# Patient Record
(demographics unavailable — no encounter records)

---

## 2019-06-14 NOTE — RAD REPORT
EXAM DESCRIPTION:  RAD - Chest Single View - 6/14/2019 1:19 pm

 

CLINICAL HISTORY:  Cough and congestion, weakness

 

COMPARISON:  None.

 

TECHNIQUE:  AP portable chest image was obtained 1316 hours .

 

FINDINGS:  Lungs are clear. Heart and vasculature are normal. No measurable pleural effusion and no p
neumothorax. No acute bony abnormality seen. No acute aortic findings suspected.

 

IMPRESSION:  No acute cardiopulmonary process.

## 2019-06-14 NOTE — ER
Nurse's Notes                                                                                     

 Former[facility]                                                                 

Name: Philip Campos                                                                               

Age: 46 yrs                                                                                       

Sex: Male                                                                                         

: 1972                                                                                   

MRN: [id number]                                                                                   

Arrival Date: 2019                                                                          

Time: 12:15                                                                                       

Account#: [id number]                                                                            

Bed 2                                                                                             

Private MD:                                                                                       

Diagnosis: Hypertension: poorly controlled; left lower mandibular pain (no abscess)               

                                                                                                  

Presentation:                                                                                     

                                                                                             

12:18 Presenting complaint: Patient states: "I had an MRI of my back about 2 weeks ago and    aa5 

      the doctor in [place] said my back is messed up but I am just so weak and my left jaw       

      hurts". Pt reports generalized weakness, pt also reports toothache to left side, and        

      body aches. Pt reports symptoms began 1-2 weeks ago. Pt states "I also feel like a ball     

      under my testicles and it hurts a little bit". Transition of care: patient was not          

      received from another setting of care. Onset of symptoms was 2019. Risk                

      Assessment: Do you want to hurt yourself or someone else? Patient reports no desire to      

      harm self or others. Initial Sepsis Screen: Does the patient meet any 2 criteria? No.       

      Patient's initial sepsis screen is negative. Does the patient have a suspected source       

      of infection? No. Patient's initial sepsis screen is negative. Care prior to arrival:       

      None.                                                                                       

12:18 Method Of Arrival: Ambulatory                                                           aa5 

12:18 Acuity: DALIA 3                                                                           aa5 

                                                                                                  

Historical:                                                                                       

- Allergies:                                                                                      

12:21 No Known Allergies;                                                                     aa5 

- PMHx:                                                                                           

12:21 Hypertension; Back problems/pain;                                                       aa5 

- PSHx:                                                                                           

12:21 None;                                                                                   aa5 

                                                                                                  

- Immunization history:: Adult Immunizations unknown.                                             

- Social history:: Smoking status: Patient uses tobacco products, 2 cigarettes a day .            

- Ebola Screening: : No symptoms or risks identified at this time.                                

                                                                                                  

                                                                                                  

Screenin:52 Abuse screen: Denies threats or abuse. Denies injuries from another. Nutritional        ph  

      screening: No deficits noted. Tuberculosis screening: No symptoms or risk factors           

      identified. Fall Risk None identified.                                                      

                                                                                                  

Assessment:                                                                                       

12:45 General: Appears in no apparent distress. uncomfortable, Behavior is calm, cooperative, ph  

      appropriate for age. Pain: Complains of pain in left jaw and back. Neuro: Level of          

      Consciousness is awake, alert, obeys commands, Oriented to person, place, time,             

      situation. Neuro: Reports weakness in " all over" x 2 weeks. Cardiovascular: Capillary      

      refill < 3 seconds in bilateral fingers Patient's skin is warm and dry. Respiratory:        

      Airway is patent Respiratory effort is even, unlabored, Respiratory pattern is regular,     

      symmetrical, Denies cough, shortness of breath. GI: No signs and/or symptoms were           

      reported involving the gastrointestinal system. Patient currently denies abdominal          

      pain, diarrhea, nausea, vomiting. : Reports " a bump " under scrotum that is tender       

      upon palpation. Derm: Skin is intact, is healthy with good turgor, Skin is pink, warm \T\   

      dry. Musculoskeletal: Circulation, motion, and sensation intact. Range of motion:           

      intact in all extremities.                                                                  

12:53 Reassessment: Patient appears in no apparent distress at this time. Patient and/or      ph  

      family updated on plan of care and expected duration. Pain level reassessed. Patient is     

      alert, oriented x 3, equal unlabored respirations, skin warm/dry/pink. Pt ambulated to      

      restroom w/ steady gait, urine sample obtained.                                             

14:00 Reassessment: Patient appears in no apparent distress at this time. Patient and/or      ph  

      family updated on plan of care and expected duration. Pain level reassessed. Patient is     

      alert, oriented x 3, equal unlabored respirations, skin warm/dry/pink. Pt resting           

      quietly, awaiting lab and radiology results, SO at bedside.                                 

14:59 Reassessment: Patient appears in no apparent distress at this time. Patient and/or      ph  

      family updated on plan of care and expected duration. Pain level reassessed. Patient is     

      alert, oriented x 3, equal unlabored respirations, skin warm/dry/pink. Pt medicated for     

      per per ERP order, ambulated to restroom w/ steady gait.                                    

                                                                                                  

Vital Signs:                                                                                      

12:22  / 98; Pulse 100; Resp 16 S; Temp 98.2(TE); Pulse Ox 96% ; Weight 117.93 kg (R);  aa5 

      Height 5 ft. 5 in. (165.10 cm) (R); Pain 10/10;                                             

13:30  / 111; Pulse 92; Resp 21; Temp 98.1(O); Pulse Ox 96% on R/A; Pain 10/10;         dh3 

14:29  / 126; Pulse 87; Resp 22; Temp 97.9(O); Pulse Ox 95% on R/A; Pain 10/10;         dh3 

16:00  / 103; Pulse 85; Resp 18; Pulse Ox 96% on R/A;                                   ph  

12:22 Body Mass Index 43.27 (117.93 kg, 165.10 cm)                                            aa5 

                                                                                                  

ED Course:                                                                                        

12:15 Patient arrived in ED.                                                                  aa5 

12:18 Arm band placed on.                                                                     aa5 

12:20 Triage completed.                                                                       aa5 

12:27 Saúl Mead MD is Attending Physician.                                              kdr 

12:40 Cyndy Wing, RN is Primary Nurse.                                                    ph  

13:00 First set of blood cultures drawn by me. Missed attempt(s): 18 gauge in right forearm.  dh3 

      Bleeding controlled, band aid applied, catheter tip intact.                                 

13:16 Initial lab(s) drawn, by me, sent to lab. Second set of blood cultures drawn by me.     3 

      Inserted saline lock: 18 gauge in right antecubital area, using aseptic technique.          

      Blood collected.                                                                            

13:20 Chest Single View XRAY In Process Unspecified.                                          EDMS

14:05 CT completed. Patient tolerated procedure well. Patient moved to CT via wheelchair.       

      Patient moved back from CT.                                                                 

14:07 CT Soft Tissue Neck W/contr In Process Unspecified.                                     EDMS

14:53 Patient has correct armband on for positive identification. Placed in gown. Bed in low  ph  

      position. Cardiac monitor on. Pulse ox on. NIBP on. Door closed. Noise minimized. Warm      

      blanket given.                                                                              

15:00 No provider procedures requiring assistance completed.                                  ph  

16:25 IV discontinued, intact, bleeding controlled, No redness/swelling at site. Pressure     ph  

      dressing applied.                                                                           

                                                                                                  

Administered Medications:                                                                         

14:00 Drug: Rocephin - (cefTRIAXone) 1 grams Route: IVPB; Infused Over: 30 mins; Site: right  ph  

      antecubital;                                                                                

14:30 Follow up: Response: No adverse reaction; IV Status: Completed infusion                 ph  

14:51 Drug: cloNIDine 0.2 mg Route: PO;                                                       ph  

15:30 Follow up: Response: No adverse reaction; Blood pressure is lowered                     ph  

14:51 Drug: Lisinopril 10 mg Route: PO;                                                       ph  

15:30 Follow up: Response: No adverse reaction; Blood pressure is lowered                     ph  

14:51 Drug: traMADol 100 mg Route: PO;                                                        ph  

15:30 Follow up: Response: No adverse reaction; Pain is decreased                             ph  

                                                                                                  

                                                                                                  

Point of Care Testing:                                                                            

      Blood Glucose:                                                                              

13:31 Blood Glucose: 338 mg/dL;                                                               dh3 

      Ranges:                                                                                     

                                                                                                  

Outcome:                                                                                          

16:22 Discharge ordered by MD.                                                                kdr 

16:38 Patient left the ED.                                                                    ph  

16:38 Discharged to home ambulatory, with significant other.                                  ph  

16:38 Condition: good                                                                             

16:38 Discharge instructions given to patient, Instructed on discharge instructions, follow       

      up and referral plans. medication usage, Demonstrated understanding of instructions,        

      follow-up care, medications, Prescriptions given X 2.                                       

                                                                                                  

Signatures:                                                                                       

Dispatcher [employer]                           EDMS                                                 

Saúl Mead MD MD kdr Jones, Susan sj Calderon, Audri RN                     RN   aa5                                                  

Cyndy Wing RN                      RN                                                      

Sandra Peterson                              3                                                  

                                                                                                  

Corrections: (The following items were deleted from the chart)                                    

12:20 12:18 Presenting complaint: Patient states: "I had an MRI of my back about 2 weeks ago  aa5 

      and the doctor in [place] said my back is messed up but I am just so weak and my left       

      jaw hurts". Pt reports generalized weakness, pt also reports toothache to left side. Pt     

      reports symptoms began 1-2 weeks ago aa5                                                    

12:22 12:18 Presenting complaint: Patient states: "I had an MRI of my back about 2 weeks ago  aa5 

      and the doctor in [place] said my back is messed up but I am just so weak and my left       

      jaw hurts". Pt reports generalized weakness, pt also reports toothache to left side,        

      and body aches. Pt reports symptoms began 1-2 weeks ago aa5                                 

                                                                                                  

**************************************************************************************************

## 2019-06-14 NOTE — RAD REPORT
EXAM DESCRIPTION:  CT - Soft Tissue Neck W/Contr - 6/14/2019 2:05 pm

 

CLINICAL HISTORY:  Trismus, patient details left-sided jaw pain, left-sided toothache

 

TECHNIQUE:  During dynamic enhancement using 100 milliliters nonionic IV contrast, axial 5 millimeter
 thick images of the neck were obtained.

 

All CT scans are performed using dose optimization technique as appropriate and may include automated
 exposure control or mA/KV adjustment according to patient size.

 

FINDINGS:  Intracranial portion of the examination is unremarkable. No globe or orbital content abnor
mality. Mastoid air cells are not pneumatized in this patient. The paranasal sinuses are clear.

 

No pharyngeal mucosal mass or asymmetry. Tonsillar and tongue base tissues shows no suspicious findin
g. Parapharyngeal fat is normal in appearance. Soft palate, epiglottis and laryngeal structures also 
unremarkable.

 

Parotid, submandibular and thyroid gland tissue show no suspicious findings.

 

No acute bone finding identifiable. No erosive or destructive changes in the mandible or maxilla.

 

No periodontal mass, abscess or measurable inflammatory stranding. No soft tissue mass or abnormal ly
mphadenopathy.

 

IMPRESSION:  As detailed above, CT soft tissue neck examination shows no significant or suspicious fi
nding.

## 2019-06-14 NOTE — EDPHYS
Physician Documentation                                                                           

 [facility]                                                                 

Name: Philip Campos                                                                               

Age: 46 yrs                                                                                       

Sex: Male                                                                                         

: 1972                                                                                   

MRN: [id number]                                                                                   

Arrival Date: 2019                                                                          

Time: 12:15                                                                                       

Account#: [id number]                                                                            

Bed 2                                                                                             

Private MD:                                                                                       

ED Physician Saúl Mead                                                                       

HPI:                                                                                              

                                                                                             

15:20 This 46 yrs old  Male presents to ER via Ambulatory with complaints of multiple kdr 

      c/o including left jaw aching, trismus, lower extremity tightness and it hurts under my     

      scrotum.                                                                                    

15:20 The patient states that for the past two weeks I have been hurting all over and I have  kdr 

      difficulty opening my mouth. He thinks he may have a dental abscess on the left lower       

      mandible.. Onset: The symptoms/episode began/occurred gradually. Severity of symptoms:      

      At their worst the symptoms were moderate in the emergency department the symptoms are      

      unchanged. The patient has not experienced similar symptoms in the past. The patient        

      has been recently seen by a physician: the patient's primary care provider, States he       

      had an MRI of his back and he has chronic back pain .                                       

15:20 He has been in Lisinopril in the past but ran out and did not get a refill..            kdr 

                                                                                                  

Historical:                                                                                       

- Allergies:                                                                                      

12:21 No Known Allergies;                                                                     aa5 

- PMHx:                                                                                           

12:21 Hypertension; Back problems/pain;                                                       aa5 

- PSHx:                                                                                           

12:21 None;                                                                                   aa5 

                                                                                                  

- Immunization history:: Adult Immunizations unknown.                                             

- Social history:: Smoking status: Patient uses tobacco products, 2 cigarettes a day .            

- Ebola Screening: : No symptoms or risks identified at this time.                                

                                                                                                  

                                                                                                  

ROS:                                                                                              

15:20 Constitutional: Negative for fever, chills, and weight loss, Eyes: Negative for injury, kdr 

      pain, redness, and discharge, ENT: Negative for injury, pain, and discharge - he does       

      c/o mild trismus and neck pain Cardiovascular: Negative for chest pain, palpitations,       

      and edema, Respiratory: Negative for shortness of breath, cough, wheezing, and              

      pleuritic chest pain, Abdomen/GI: Negative for abdominal pain, nausea, vomiting,            

      diarrhea, and constipation, MS/Extremity: Negative for injury and deformity, - he does      

      c/o pain under his scrotum Skin: Negative for injury, rash, and discoloration, Neuro:       

      Negative for headache, weakness, numbness, tingling, and seizure activity. Psych:           

      Negative for depression, anxiety, suicide ideation, homicidal ideation, and                 

      hallucinations.                                                                             

15:20 Neck: Positive for pain at rest, stiffness, Negative for injury or acute deformity.         

                                                                                                  

Exam:                                                                                             

17:16 Constitutional:  This is a well developed, well nourished patient who is awake, alert,  kdr 

      and in no acute distress. Head/Face:  Normocephalic, atraumatic.  The patient c/oi pain     

      to the left TMJ area.  No erythema or abnormal swelling noted Eyes:  Pupils equal round     

      and reactive to light, extra-ocular motions intact.  Lids and lashes normal.                

      Conjunctiva and sclera are non-icteric and not injected.  Cornea within normal limits.      

      Periorbital areas with no swelling, redness, or edema. Neck:  Trachea midline, no           

      thyromegaly or masses palpated, and no cervical lymphadenopathy.  Supple, full range of     

      motion without nuchal rigidity, or vertebral point tenderness.  No Meningismus.             

      Chest/axilla:  Normal chest wall appearance and motion.  Nontender with no deformity.       

      No lesions are appreciated. Cardiovascular:  Regular rate and rhythm with a normal S1       

      and S2.  No gallops, murmurs, or rubs.  Normal PMI, no JVD.  No pulse deficits.             

      Respiratory:  Lungs have equal breath sounds bilaterally, clear to auscultation and         

      percussion.  No rales, rhonchi or wheezes noted.  No increased work of breathing, no        

      retractions or nasal flaring. Abdomen/GI:  Soft, non-tender, with normal bowel sounds.      

      No distension or tympany.  No guarding or rebound.  No evidence of tenderness               

      throughout. Back:  No spinal tenderness.  No costovertebral tenderness.  Full range of      

      motion. Skin:  Warm, dry with normal turgor.  Normal color with no rashes, no lesions,      

      and no evidence of cellulitis. MS/ Extremity:  Pulses equal, no cyanosis.                   

      Neurovascular intact.  Full, normal range of motion. Neuro:  Awake and alert, GCS 15,       

      oriented to person, place, time, and situation.  Cranial nerves II-XII grossly intact.      

      Motor strength 5/5 in all extremities.  Sensory grossly intact.  Cerebellar exam            

      normal.  Normal gait. Psych:  Awake, alert, with orientation to person, place and time.     

       Behavior, mood, and affect are within normal limits.                                       

                                                                                                  

Vital Signs:                                                                                      

12:22  / 98; Pulse 100; Resp 16 S; Temp 98.2(TE); Pulse Ox 96% ; Weight 117.93 kg (R);  aa5 

      Height 5 ft. 5 in. (165.10 cm) (R); Pain 10/10;                                             

13:30  / 111; Pulse 92; Resp 21; Temp 98.1(O); Pulse Ox 96% on R/A; Pain 10/10;         dh3 

14:29  / 126; Pulse 87; Resp 22; Temp 97.9(O); Pulse Ox 95% on R/A; Pain 10/10;         dh3 

16:00  / 103; Pulse 85; Resp 18; Pulse Ox 96% on R/A;                                   ph  

12:22 Body Mass Index 43.27 (117.93 kg, 165.10 cm)                                            aa5 

                                                                                                  

MDM:                                                                                              

16:05 Data reviewed: vital signs, nurses notes. Counseling: I had a detailed discussion with  kdr 

      the patient and/or guardian regarding: the historical points, exam findings, and any        

      diagnostic results supporting the discharge/admit diagnosis, lab results, radiology         

      results, the need for outpatient follow up. ED course: The patient states he is feeling     

      much better. He does not appear in any acute distress and his BP is much improved. His      

      symptoms have been present for up to two weeks. He was sleeping on my last visit in the     

      room though still maintained that his pain was an 8/10. He had no s/s of meningitis or      

      other acute illness. .                                                                      

16:22 Patient medically screened.                                                             [facility] 

                                                                                                  

                                                                                             

12:44 Order name: Basic Metabolic Panel                                                       [facility] 

                                                                                             

12:44 Order name: Blood Culture Adult (2)                                                     [facility] 

                                                                                             

12:44 Order name: CBC with Diff                                                               kdr 

                                                                                             

12:44 Order name: Ckmb                                                                        [facility] 

                                                                                             

12:44 Order name: CPK                                                                         [facility] 

                                                                                             

12:44 Order name: Lactate; Complete Time: 14:15                                               [facility] 

                                                                                             

12:44 Order name: LFT's                                                                       [facility] 

                                                                                             

12:44 Order name: Lipase                                                                      [facility] 

                                                                                             

12:44 Order name: Procalcitonin; Complete Time: 14:34                                         [facility] 

                                                                                             

12:44 Order name: Protime (+inr); Complete Time: 13:51                                        [facility] 

                                                                                             

12:44 Order name: Ptt, Activated; Complete Time: 13:51                                        kdr 

                                                                                             

12:44 Order name: Troponin (emerg Dept Use Only)                                              kdr 

                                                                                             

12:44 Order name: Urine Microscopic Only; Complete Time: 14:34                                kdr 

                                                                                             

12:49 Order name: Basic Metabolic Panel; Complete Time: 14:15                                 [facility]

                                                                                             

12:44 Order name: Chest Single View XRAY; Complete Time: 13:51                                kdr 

                                                                                             

12:44 Order name: Accucheck; Complete Time: 13:35                                             kdr 

                                                                                             

12:44 Order name: Cardiac monitoring; Complete Time: 13:35                                    kdr 

                                                                                             

12:44 Order name: IV Saline Lock - Large Bore; Complete Time: 13:24                           kdr 

                                                                                             

12:44 Order name: Labs collected and sent; Complete Time: 13:24                               kdr 

                                                                                             

12:44 Order name: CT Soft Tissue Neck W/contr; Complete Time: 14:34                           kdr 

                                                                                             

12:49 Order name: Blood Culture                                                               [facility]

                                                                                             

12:49 Order name: CBC with Automated Diff; Complete Time: 13:51                               [facility]

                                                                                             

12:50 Order name: CKMB Creatine Kinase MB; Complete Time: 14:15                               [facility]

                                                                                             

12:50 Order name: Creatine Phosphokinase; Complete Time: 14:15                                [facility]

14                                                                                             

13:37 Order name: Glucose, Ancillary Testing; Complete Time: 13:51                            [facility]

                                                                                             

13:46 Order name: Urine Dipstick--Ancillary (enter results); Complete Time: 14:34             ms  

14                                                                                             

12:44 Order name: O2 Per Protocol; Complete Time: 13:35                                       kdr 

                                                                                             

12:44 Order name: O2 Sat Monitoring; Complete Time: 13:35                                     kdr 

                                                                                             

12:44 Order name: Urine Dipstick-Ancillary (obtain specimen); Complete Time: 13:35            kdr 

                                                                                                  

Administered Medications:                                                                         

14:00 Drug: Rocephin - (cefTRIAXone) 1 grams Route: IVPB; Infused Over: 30 mins; Site: right  ph  

      antecubital;                                                                                

14:30 Follow up: Response: No adverse reaction; IV Status: Completed infusion                 ph  

14:51 Drug: cloNIDine 0.2 mg Route: PO;                                                       ph  

15:30 Follow up: Response: No adverse reaction; Blood pressure is lowered                     ph  

14:51 Drug: Lisinopril 10 mg Route: PO;                                                       ph  

15:30 Follow up: Response: No adverse reaction; Blood pressure is lowered                     ph  

14:51 Drug: traMADol 100 mg Route: PO;                                                        ph  

15:30 Follow up: Response: No adverse reaction; Pain is decreased                             ph  

                                                                                                  

                                                                                                  

Point of Care Testing:                                                                            

      Blood Glucose:                                                                              

13:31 Blood Glucose: 338 mg/dL;                                                               dh3 

      Ranges:                                                                                     

      Critical Glucose Levels:Adult <50 mg/dl or >400 mg/dl  <40 mg/dl or >180 mg/dl       

Disposition:                                                                                      

19 16:22 Discharged to Home. Impression: Hypertension: poorly controlled; left lower        

  mandibular pain (no abscess).                                                                   

- Condition is Stable.                                                                            

- Discharge Instructions: Hypertension, Easy-to-Read.                                             

- Prescriptions for Lisinopril 20 mg Oral Tablet - take 1 tablet by ORAL route once               

  daily; 20 tablet. Tramadol 50 mg Oral Tablet - take 1 tablet by ORAL route every 8              

  hours as needed; 12 tablet.                                                                     

- Medication Reconciliation Form, Thank You Letter form.                                          

- Follow up: Private Physician; When: 2 - 3 days; Reason: If symptoms return, Further             

  diagnostic work-up, Recheck today's complaints, Continuance of care, Re-evaluation by           

  your physician.                                                                                 

- Problem is an acute exacerbation.                                                               

- Symptoms have improved.                                                                         

                                                                                                  

                                                                                                  

                                                                                                  

Signatures:                                                                                       

Dispatcher [employer]                           EDMS                                                 

Saúl Mead MD MD   [facility]                                                  

Temi Anglin RN                     RN   aa5                                                  

Cyndy Wing RN                      RN   ph                                                   

                                                                                                  

Corrections: (The following items were deleted from the chart)                                    

16:38 16:22 2019 16:22 Discharged to Home. Impression: Hypertension: poorly controlled; ph  

      left lower mandibular pain (no abscess). Condition is Stable. Forms are Medication          

      Reconciliation Form, Thank You Letter, Antibiotic Education, Prescription Opioid Use.       

      Follow up: Private Physician; When: 2 - 3 days; Reason: If symptoms return, Further         

      diagnostic work-up, Recheck today's complaints, Continuance of care, Re-evaluation by       

      your physician. Problem is an acute exacerbation. Symptoms have improved. kdr               

                                                                                                  

**************************************************************************************************